# Patient Record
Sex: FEMALE | URBAN - METROPOLITAN AREA
[De-identification: names, ages, dates, MRNs, and addresses within clinical notes are randomized per-mention and may not be internally consistent; named-entity substitution may affect disease eponyms.]

---

## 2020-08-05 ENCOUNTER — VIRTUAL VISIT (OUTPATIENT)
Dept: FAMILY MEDICINE | Facility: OTHER | Age: 25
End: 2020-08-05

## 2020-08-06 NOTE — PROGRESS NOTES
"Date: 2020 14:26:24  Clinician: Tara Traore  Clinician NPI: [id number]  Patient: sophy infante  Patient : 1995  Patient Address: [address] [place] [address] [address], [place] [address]  Patient Phone: [phone]  Visit Protocol: URI  Patient Summary:  sophy is a 25 year old ( : 1995 ) female who initiated a Visit for COVID-19 (Coronavirus) evaluation and screening. When asked the question \"Please sign me up to receive news, health information and promotions. \", sophy responded \"No\".    When asked when her symptoms started, sophy reported that she does not have any symptoms.   She denies taking antibiotic medication in the past month and having recent facial or sinus surgery in the past 60 days.    Pertinent COVID-19 (Coronavirus) information  In the past 14 days, sophy has not worked in a congregate living setting.   She does not work or volunteer as healthcare worker or a  and does not work or volunteer in a healthcare facility.   sophy also has not lived in a congregate living setting in the past 14 days. She does not live with a healthcare worker.   sophy has not had a close contact with a laboratory-confirmed COVID-19 patient within the last 14 days.   Since 2019, sophy and has not had upper respiratory infection or influenza-like illness. Has not been diagnosed with lab-confirmed COVID-19 test   Pertinent medical history  sophy does not get yeast infections when she takes antibiotics.   sophy needs a return to work/school note.   Weight: 155 lbs   sophy does not smoke or use smokeless tobacco.   She denies pregnancy and denies breastfeeding. She has menstruated in the past month.   Weight: 155 lbs    MEDICATIONS: No current medications, ALLERGIES: NKDA  Clinician Response:  Dear sophy,   Based on the details you've shared, you do not need to be tested at this time.  What should I do?  Cover your mouth and nose with a mask, tissue or washcloth to avoid " spreading germs.  Wash your hands and face often. Use soap and water.  What are the symptoms of COVID-19?  The most common symptoms are cough, fever and trouble breathing. Less common symptoms include headache, body aches, fatigue (feeling very tired), chills, sore throat, stuffy or runny nose, diarrhea (loose poop), loss of taste or smell, belly pain, and nausea or vomiting (feeling sick to your stomach or throwing up)..  If you develop symptoms, follow these guidelines.  If you're normally healthy: Please start another [facility] visit to report your symptoms. Go to [email or web address].  If you have a serious health problem (like cancer, heart failure, an organ transplant or kidney disease): Call your specialty clinic. Let them know that you might have COVID-19.  Where can I get more information?   [facility] -- About COVID-19: [email or web address]  CDC -- What to Do If You're Sick: www.cdc.gov/coronavirus/2019-ncov/about/steps-when-sick.html  CDC -- Ending Home Isolation: www.cdc.gov/coronavirus/2019-ncov/hcp/disposition-in-home-patients.html  CDC -- Caring for Someone: www.cdc.gov/coronavirus/2019-ncov/if-you-are-sick/care-for-someone.html  [facility] -- Interim Guidance for Hospital Discharge to Home: www.health.[facility].[place]/diseases/coronavirus/hcp/hospdischarge.pdf  [facility] clinical trials (COVID-19 research studies): clinicalaffairs.[facility]-clinical-trials  Below are the COVID-19 hotlines at the [place] Department of [facility] ([facility]). Interpreters are available.   For health questions: Call 749-074-0378 or 1-244.284.8223 (7 a.m. to 7 p.m.) For questions about schools and childcare: Call 589-139-2502 or 1-215.525.4526 (7 a.m. to 7 p.m.)     .      Diagnosis: Worries  Diagnosis ICD: R45.82